# Patient Record
(demographics unavailable — no encounter records)

---

## 2024-11-27 NOTE — ASSESSMENT
[FreeTextEntry1] : Patient presents for new patient Gastroenterology evaluation because of multiple complex gastrointestinal signs and symptoms. Has complex syndrome of left sided abdominal pain.   New order placed for MRI of abdomen and pelvis.   New prescription sent to pharmacy for dicyclomine.   I personally reviewed colonoscopy results from Dr. Flores  I reviewed cat scan of abdomen and pelvis from [facility]   Moderate degree of complexity of medical decision making involved in this patient encounter

## 2024-11-27 NOTE — PHYSICAL EXAM
[Alert] : alert [Normal Voice/Communication] : normal voice/communication [Healthy Appearing] : healthy appearing [No Acute Distress] : no acute distress [Sclera] : the sclera and conjunctiva were normal [Hearing Threshold Finger Rub Not [place]] : hearing was normal [Normal Lips/Gums] : the lips and gums were normal [Oropharynx] : the oropharynx was normal [Normal Appearance] : the appearance of the neck was normal [No Neck Mass] : no neck mass was observed [No Respiratory Distress] : no respiratory distress [No Acc Muscle Use] : no accessory muscle use [Respiration, Rhythm And Depth] : normal respiratory rhythm and effort [Auscultation Breath Sounds / Voice Sounds] : lungs were clear to auscultation bilaterally [Heart Rate And Rhythm] : heart rate was normal and rhythm regular [Normal S1, S2] : normal S1 and S2 [Murmurs] : no murmurs [Bowel Sounds] : normal bowel sounds [Abdomen Tenderness] : non-tender [No Masses] : no abdominal mass palpated [] : no hepatosplenomegaly [Abdomen Soft] : soft [Oriented To Time, Place, And Person] : oriented to person, place, and time

## 2024-11-27 NOTE — HISTORY OF PRESENT ILLNESS
[FreeTextEntry1] : Chief complaint: abdominal pain, alteration in bowel habits   HPI: Patient presents for new patient Gastroenterology evaluation because of multiple complex Gastrointestinal signs and symptoms. Patient with complex syndrome of left sided abdominal pain. Intermittent crampy llq abdominal pain; there are no exacerbating or ameliorating factors; the abdominal pain is non radiating.   Patient had some gastrointestinal bleeding and underwent a colonoscopy by colorectal surgeon Dr. Flores, which was reported as normal.   Denies dysphagia, odynophagia or dyspepsia.   New diagnostic test ordered, mri of abdomen and pelvis.  New prescription ordered, dicyclomine.  Moderate degree of complexity of medical decision making involved in this patient encounter

## 2024-12-21 NOTE — HISTORY OF PRESENT ILLNESS
[FreeTextEntry1] : Chief complaint: ibs, spastic colon, abdominal discomfort  HPI: Patient presents for Gastro followup. He is feeling significantly better on the dicyclomine. His bowel movements are somewhat better. Sometimes feels some irritation from hemorrhoids  He is trying to fiber supplementation  New refill sent for dicyclomine.  Proctosol cream sent for hemorrohoids   Moderate degree of complexity of medical decision making involved in this patient encounter

## 2024-12-21 NOTE — PHYSICAL EXAM
[Alert] : alert [Normal Voice/Communication] : normal voice/communication [Healthy Appearing] : healthy appearing [No Acute Distress] : no acute distress [Sclera] : the sclera and conjunctiva were normal [Hearing Threshold Finger Rub Not [place]] : hearing was normal [Normal Lips/Gums] : the lips and gums were normal [Oropharynx] : the oropharynx was normal [Normal Appearance] : the appearance of the neck was normal [No Neck Mass] : no neck mass was observed [No Respiratory Distress] : no respiratory distress [No Acc Muscle Use] : no accessory muscle use [Respiration, Rhythm And Depth] : normal respiratory rhythm and effort [Auscultation Breath Sounds / Voice Sounds] : lungs were clear to auscultation bilaterally [Heart Rate And Rhythm] : heart rate was normal and rhythm regular [Normal S1, S2] : normal S1 and S2 [Murmurs] : no murmurs [Bowel Sounds] : normal bowel sounds [Abdomen Tenderness] : non-tender [No Masses] : no abdominal mass palpated [Abdomen Soft] : soft [] : no hepatosplenomegaly [Oriented To Time, Place, And Person] : oriented to person, place, and time

## 2024-12-21 NOTE — ASSESSMENT
[FreeTextEntry1] : Patient presents for followup of multiple gastro issues  It seems like he is doing significantly better on the dicyclomine  Moderate degree of complexity of medical decision making involved in this patient encounter

## 2025-01-25 NOTE — PHYSICAL EXAM
[Alert] : alert [Normal Voice/Communication] : normal voice/communication [Healthy Appearing] : healthy appearing [No Acute Distress] : no acute distress [Sclera] : the sclera and conjunctiva were normal [Hearing Threshold Finger Rub Not [place]] : hearing was normal [Normal Lips/Gums] : the lips and gums were normal [Oropharynx] : the oropharynx was normal [Normal Appearance] : the appearance of the neck was normal [No Neck Mass] : no neck mass was observed [No Respiratory Distress] : no respiratory distress [No Acc Muscle Use] : no accessory muscle use [Respiration, Rhythm And Depth] : normal respiratory rhythm and effort [Auscultation Breath Sounds / Voice Sounds] : lungs were clear to auscultation bilaterally [Heart Rate And Rhythm] : heart rate was normal and rhythm regular [Normal S1, S2] : normal S1 and S2 [Murmurs] : no murmurs [Bowel Sounds] : normal bowel sounds [Abdomen Tenderness] : non-tender [No Masses] : no abdominal mass palpated [Abdomen Soft] : soft [] : no hepatosplenomegaly [Oriented To Time, Place, And Person] : oriented to person, place, and time

## 2025-01-25 NOTE — ASSESSMENT
[FreeTextEntry1] : Patient presents for followup of multiple complex gastrointestinal signs and symptoms  Has acute flare of spastic colon as well as reflux esophagitis   I reviewed MRI report with patient   new prescription for dicyclomine sent to pharmacy  I extensively reviewed medical records on the patient including review of labs, review of outside medical records, review of medical imaging reports   High degree of complexity of medical decision making involved in this patient encounter

## 2025-01-25 NOTE — HISTORY OF PRESENT ILLNESS
[FreeTextEntry1] : Chief complaint: gerd, dyspepsia  HPI: Patient presents for followup of multiple Gastrointestinal issues. Patient with history of postprandial bloating.which is intermittent. There are no exacerbating or ameliorating factors. No sign of acute gastrointestinal bleeding such as hematemesis, melena or hematochezia, no dysphagia or odynophagia.   I reviewed report of MRI of abdomen with patient. No sign of bowel wall thickening, no sign of obstruction of infection.   New prescription for dicyclomine sent to pharmacy. New prescription for dicyclomine sent to pharmacy.  As part of this complex Gastroenterology assessment, I extensively reviewed patient's medical records including lab results, medical imaging reports and subspecialty consultations.  High degree of complexity of medical decision making involved in this patient encounter

## 2025-03-14 NOTE — HISTORY OF PRESENT ILLNESS
[FreeTextEntry1] : Chief complaint; abdominal pain   HPI: Patient presents for followup of multiple complex gastrointestinal signs and symptoms. Had abrupt onset of loose bowel movements with grey colored mucus and lower abdominal discomfort.   He was seen at an urgent care center; he has mild elevations of his serum ast and alt. He has a personal and family history of elevated cholesterol and takes rosuvastatin.   Most likely patient has mild fatty liver, I reviewed MRI images and report with patient  New prescription placed for ultrasound of the liver.   moderate degree of complexity of medical decision making involved in this patient encounter

## 2025-03-14 NOTE — ASSESSMENT
[FreeTextEntry1] : Patient presents for followup of multiple complex gastrointestinal signs and symptoms   New prescription placed for ultrasound of abdomen

## 2025-04-26 NOTE — ASSESSMENT
[FreeTextEntry1] : DIAGNOSIS  ACUTE FLARE OF COLITIS  LLQ ABDOMINAL PAIN  DYSPEPSIA   PLAN  LOW RESIDUE DIET  NEW PRESCRIPTION FOR DICYCLOMINE SENT TO PHARMACY NEW PRESCRIPTION FOR PROCTOSOL SENT TO PHARMACY NEW PROCEDURE ORDERED WAS COLONOSCOPY RISKS, BENEFITS, ALTERNATIVES OF PROCEDURE REVIEWED

## 2025-04-26 NOTE — HISTORY OF PRESENT ILLNESS
[FreeTextEntry1] : Chief complaint: abdominal pain, flare of colitis   HPI:Patient presents for evaluation and management of multiple complex gastrointestinal signs and symptoms. Complex syndrome of abdominal pain with intermittent complex rlq and llq abdominal pain; no exacerbating or ameliorating factors, the abdominal pain is non radiating   New prescriptions for dicyclomine and protosol sent to pharmacy.   High fiber diet and fiber supplementation reviewed with patient.   New test ordered was colonoscopy. The risks, benefits, alternatives of procedure reviewed with patient who gives informed consent.

## 2025-06-14 NOTE — ASSESSMENT
[FreeTextEntry1] :  ASSESSMENT Patient with abdominal discomfort, spasm, degree of inflammation.   PLAN New  prescription for budesonide sent to pharmacy.  New prescription for dicyclomine sent to pharmacy. Medications, allergies, and problem list were reviewed and reconciled.

## 2025-06-14 NOTE — PHYSICAL EXAM
[Alert] : alert [Normal Voice/Communication] : normal voice/communication [Healthy Appearing] : healthy appearing [No Acute Distress] : no acute distress [Hearing Threshold Finger Rub Not [place]] : hearing was normal [Sclera] : the sclera and conjunctiva were normal [Normal Lips/Gums] : the lips and gums were normal [Oropharynx] : the oropharynx was normal [Normal Appearance] : the appearance of the neck was normal [No Neck Mass] : no neck mass was observed [No Respiratory Distress] : no respiratory distress [No Acc Muscle Use] : no accessory muscle use [Auscultation Breath Sounds / Voice Sounds] : lungs were clear to auscultation bilaterally [Respiration, Rhythm And Depth] : normal respiratory rhythm and effort [Heart Rate And Rhythm] : heart rate was normal and rhythm regular [Normal S1, S2] : normal S1 and S2 [Murmurs] : no murmurs [Bowel Sounds] : normal bowel sounds [Abdomen Tenderness] : non-tender [No Masses] : no abdominal mass palpated [Abdomen Soft] : soft [] : no hepatosplenomegaly [Oriented To Time, Place, And Person] : oriented to person, place, and time

## 2025-06-14 NOTE — HISTORY OF PRESENT ILLNESS
[FreeTextEntry1] :  Chief complaint: Abdominal discomfort, bowel changes   HPI:   Patient presents for Gastroenterology evaluation because of multiple complex Gastrointestinal issues. Patient is following up after his colonoscopy. I reviewed multiple results and reports with the patient including reports of colonoscopy and histopathology.  Patient feels intermittent abdominal discomfort in the rlq and llq of the abdomen.    As part of this complex Gastrointestinal evaluation, I extensively reviewed prior medical records on the patient including laboratory reports, medical imaging reports, and subspecialty consultations. New prescription sent to pharmacy for budesonide. New prescription sent to pharmacy for dicyclomine.  Medications, allergies, and problem list were reviewed and reconciled.   A followup appointment has been scheduled for the patient, as the patient's medication will need to be titrated.